# Patient Record
Sex: FEMALE
[De-identification: names, ages, dates, MRNs, and addresses within clinical notes are randomized per-mention and may not be internally consistent; named-entity substitution may affect disease eponyms.]

---

## 2019-06-15 PROBLEM — Z00.00 ENCOUNTER FOR PREVENTIVE HEALTH EXAMINATION: Status: ACTIVE | Noted: 2019-06-15

## 2019-07-15 ENCOUNTER — APPOINTMENT (OUTPATIENT)
Dept: UROGYNECOLOGY | Facility: CLINIC | Age: 59
End: 2019-07-15

## 2019-08-26 ENCOUNTER — APPOINTMENT (OUTPATIENT)
Dept: UROGYNECOLOGY | Facility: CLINIC | Age: 59
End: 2019-08-26
Payer: MEDICAID

## 2019-08-26 VITALS
HEIGHT: 63 IN | WEIGHT: 137.56 LBS | BODY MASS INDEX: 24.38 KG/M2 | DIASTOLIC BLOOD PRESSURE: 90 MMHG | SYSTOLIC BLOOD PRESSURE: 138 MMHG

## 2019-08-26 DIAGNOSIS — E07.9 DISORDER OF THYROID, UNSPECIFIED: ICD-10-CM

## 2019-08-26 DIAGNOSIS — R33.9 RETENTION OF URINE, UNSPECIFIED: ICD-10-CM

## 2019-08-26 DIAGNOSIS — Z82.49 FAMILY HISTORY OF ISCHEMIC HEART DISEASE AND OTHER DISEASES OF THE CIRCULATORY SYSTEM: ICD-10-CM

## 2019-08-26 DIAGNOSIS — Z83.3 FAMILY HISTORY OF DIABETES MELLITUS: ICD-10-CM

## 2019-08-26 DIAGNOSIS — R35.1 NOCTURIA: ICD-10-CM

## 2019-08-26 DIAGNOSIS — N39.3 STRESS INCONTINENCE (FEMALE) (MALE): ICD-10-CM

## 2019-08-26 LAB
BILIRUB UR QL STRIP: NEGATIVE
CLARITY UR: CLEAR
COLLECTION METHOD: NORMAL
GLUCOSE UR-MCNC: NEGATIVE
HCG UR QL: 0.2 EU/DL
HGB UR QL STRIP.AUTO: NORMAL
KETONES UR-MCNC: NEGATIVE
LEUKOCYTE ESTERASE UR QL STRIP: NEGATIVE
NITRITE UR QL STRIP: NEGATIVE
PH UR STRIP: 5
PROT UR STRIP-MCNC: 30
SP GR UR STRIP: 1.03

## 2019-08-26 PROCEDURE — 99204 OFFICE O/P NEW MOD 45 MIN: CPT | Mod: 25

## 2019-08-26 PROCEDURE — 51701 INSERT BLADDER CATHETER: CPT

## 2019-08-26 PROCEDURE — 81003 URINALYSIS AUTO W/O SCOPE: CPT | Mod: QW

## 2019-08-26 RX ORDER — GABAPENTIN 400 MG/1
400 CAPSULE ORAL
Refills: 0 | Status: ACTIVE | COMMUNITY

## 2019-08-26 RX ORDER — AMLODIPINE BESYLATE 5 MG/1
TABLET ORAL
Refills: 0 | Status: ACTIVE | COMMUNITY

## 2019-08-26 RX ORDER — APIXABAN 5 MG/1
5 TABLET, FILM COATED ORAL
Refills: 0 | Status: ACTIVE | COMMUNITY

## 2019-08-26 RX ORDER — LEVOTHYROXINE SODIUM 0.17 MG/1
TABLET ORAL
Refills: 0 | Status: ACTIVE | COMMUNITY

## 2019-08-26 RX ORDER — METOCLOPRAMIDE 5 MG/1
TABLET ORAL
Refills: 0 | Status: ACTIVE | COMMUNITY

## 2019-08-26 RX ORDER — MIRABEGRON 25 MG/1
25 TABLET, FILM COATED, EXTENDED RELEASE ORAL
Qty: 30 | Refills: 2 | Status: ACTIVE | COMMUNITY
Start: 2019-08-26 | End: 1900-01-01

## 2019-08-26 RX ORDER — DOCUSATE SODIUM 100 MG
TABLET ORAL
Refills: 0 | Status: ACTIVE | COMMUNITY

## 2019-08-26 RX ORDER — BISOPROLOL FUMARATE 5 MG/1
TABLET, FILM COATED ORAL
Refills: 0 | Status: ACTIVE | COMMUNITY

## 2019-08-26 RX ORDER — FENTANYL 100 UG/H
100 PATCH, EXTENDED RELEASE TRANSDERMAL
Refills: 0 | Status: ACTIVE | COMMUNITY

## 2019-08-26 RX ORDER — HYDROMORPHONE HYDROCHLORIDE 2 MG/1
2 TABLET ORAL
Refills: 0 | Status: ACTIVE | COMMUNITY

## 2019-08-26 RX ORDER — UBIDECARENONE/VIT E ACET 100MG-5
CAPSULE ORAL
Refills: 0 | Status: ACTIVE | COMMUNITY

## 2019-08-26 NOTE — PHYSICAL EXAM
[No Acute Distress] : in no acute distress [Well developed] : well developed [Well Nourished] : ~L well nourished [Oriented x3] : oriented to person, place, and time [Normal Memory] : ~T memory was ~L unimpaired [Normal Mood/Affect] : mood and affect are normal [Warm and Dry] : was warm and dry to touch [Normal Gait] : gait was normal [Vulvar Atrophy] : vulvar atrophy [Labia Majora] : were normal [Labia Minora] : were normal [Normal Appearance] : general appearance was normal [Atrophy] : atrophy [2] : 2 [Normal] : no abnormalities [Post Void Residual ____ml] : post void residual via catheterization was [unfilled] ml [Cough] : no cough [Tenderness] : ~T no ~M abdominal tenderness observed [Distended] : not distended [Scar] : no scars [Inguinal LAD] : no adenopathy was noted in the inguinal lymph nodes [FreeTextEntry3] : neg CST [de-identified] : no prolapse

## 2019-08-26 NOTE — OB HISTORY
[Vaginal ___] : [unfilled] vaginal delivery(s) [unknown] : the patient is unsure of the date of her LMP [Last Pap Smear ___] : date of last pap smear was on [unfilled] [Regular Cycle Intervals] : periods have been irregular [Abnormal Pap Smear] : normal pap smear [Taking Estrogens] : is not taking estrogen replacement [Sexually Active] : is not sexually active

## 2019-08-26 NOTE — PROCEDURE
[FreeTextEntry1] : sterile straight catheterization performed to assess post void residual due to stress incontinece

## 2019-08-26 NOTE — REASON FOR VISIT
[Initial Visit ___] : [unfilled] is here today for an initial visit  for [unfilled] [[employer] ] : provided by [employer]   [FreeTextEntry1] : [id number] [FreeTextEntry2] : Roxana [TWNoteComboBox1] : [place]

## 2019-08-26 NOTE — DISCUSSION/SUMMARY
[FreeTextEntry1] : \par Rita presents with OAB and mild stress incontinence. On exam atrophy, normal PVR, no prolapse. We reviewed her symptoms and exam findings. We discussed management options for overactive bladder including observation, behavorial modifications and bladder training, physical therapy and medications including anticholinergics and beta 3 agonists. We will start with myrbetriq. We discussed if behavorial modifications and medications fail proceeding with urodynamics to further evaluate her symptoms. We discussed additional treatment options including sacral neuromodulation, PTNS and intra detrusor Botox. [facility] handout on overactive bladder and bladder training was given to her in [place]ian.\par \par [] u/a, culture\par [] cystoscopy due to OAB and metastatic cancer\par [] myrbetriq 25mg\par

## 2019-08-26 NOTE — HISTORY OF PRESENT ILLNESS
[FreeTextEntry1] : \par 59 y/o on chemotherapy for metastatic? 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

## 2019-08-26 NOTE — CHIEF COMPLAINT
[Questionnaire Received] : Patient questionnaire received [Urine Frequency] : urine frequency [Cannot Empty Bladder] : cannot empty bladder

## 2019-08-26 NOTE — LETTER BODY
[Dear  ___] : Dear  [unfilled], [Thank you very much for allowing me to participate in the care of this patient. If you have any questions, please do not hesitate to contact me] : Thank you very much for allowing me to participate in the care of this patient. If you have any questions, please do not hesitate to contact me. [Attached please find my note.] : Attached please find my note.

## 2019-08-27 ENCOUNTER — RESULT REVIEW (OUTPATIENT)
Age: 59
End: 2019-08-27

## 2019-08-28 ENCOUNTER — RESULT REVIEW (OUTPATIENT)
Age: 59
End: 2019-08-28

## 2019-09-04 LAB — BACTERIA UR CULT: NORMAL

## 2019-09-13 ENCOUNTER — RESULT REVIEW (OUTPATIENT)
Age: 59
End: 2019-09-13

## 2019-09-13 LAB
APPEARANCE: ABNORMAL
BACTERIA: NEGATIVE
BILIRUBIN URINE: NEGATIVE
BLOOD URINE: NEGATIVE
COLOR: YELLOW
GLUCOSE QUALITATIVE U: NEGATIVE
HYALINE CASTS: 0 /LPF
KETONES URINE: NEGATIVE
LEUKOCYTE ESTERASE URINE: NEGATIVE
MICROSCOPIC-UA: NORMAL
NITRITE URINE: NEGATIVE
PH URINE: 6
PROTEIN URINE: ABNORMAL
RED BLOOD CELLS URINE: 9 /HPF
SPECIFIC GRAVITY URINE: 1.03
SQUAMOUS EPITHELIAL CELLS: 4 /HPF
URINE COMMENTS: NORMAL
UROBILINOGEN URINE: NORMAL
WHITE BLOOD CELLS URINE: 3 /HPF

## 2019-09-20 ENCOUNTER — APPOINTMENT (OUTPATIENT)
Dept: UROGYNECOLOGY | Facility: CLINIC | Age: 59
End: 2019-09-20
Payer: MEDICAID

## 2019-09-20 DIAGNOSIS — R31.9 HEMATURIA, UNSPECIFIED: ICD-10-CM

## 2019-09-20 DIAGNOSIS — N39.41 URGE INCONTINENCE: ICD-10-CM

## 2019-09-20 DIAGNOSIS — R31.1 BENIGN ESSENTIAL MICROSCOPIC HEMATURIA: ICD-10-CM

## 2019-09-20 DIAGNOSIS — R32 UNSPECIFIED URINARY INCONTINENCE: ICD-10-CM

## 2019-09-20 DIAGNOSIS — R39.15 URGENCY OF URINATION: ICD-10-CM

## 2019-09-20 PROCEDURE — 52000 CYSTOURETHROSCOPY: CPT

## 2019-09-20 PROCEDURE — 99213 OFFICE O/P EST LOW 20 MIN: CPT | Mod: 25

## 2019-09-20 RX ORDER — MIRABEGRON 25 MG/1
25 TABLET, FILM COATED, EXTENDED RELEASE ORAL
Qty: 90 | Refills: 1 | Status: ACTIVE | COMMUNITY
Start: 2019-09-20 | End: 1900-01-01

## 2019-09-20 NOTE — HISTORY OF PRESENT ILLNESS
[FreeTextEntry1] : \par Rita presents for f/u on OAB symptoms including frequency, urgency. she is on myrbetriq 25mg with at least 70 % improvement in her symptoms\par \par she did have MH on initial visit\par she has had multiple CTs due to lung cancer- renal/bladder sonogram ordered\par cystoscopy today negative

## 2019-09-20 NOTE — DISCUSSION/SUMMARY
[FreeTextEntry1] : \par Rita presents for f/u on OAB, 70% improved on myrbetriq 25 mg. Cystoscopy negative, renal imaging pending. Discussed with patient, f/u in 2 months, all questions answered.\par \par [] renal imaging\par [] myrbetriq 25mg

## 2019-09-24 PROBLEM — R31.9 HEMATURIA: Status: ACTIVE | Noted: 2019-09-24

## 2019-11-25 ENCOUNTER — APPOINTMENT (OUTPATIENT)
Age: 59
End: 2019-11-25